# Patient Record
Sex: FEMALE | URBAN - METROPOLITAN AREA
[De-identification: names, ages, dates, MRNs, and addresses within clinical notes are randomized per-mention and may not be internally consistent; named-entity substitution may affect disease eponyms.]

---

## 2024-01-22 ENCOUNTER — OFFICE VISIT (OUTPATIENT)
Dept: OBSTETRICS AND GYNECOLOGY | Facility: CLINIC | Age: 69
End: 2024-01-22
Payer: MEDICARE

## 2024-01-22 VITALS
WEIGHT: 97.8 LBS | SYSTOLIC BLOOD PRESSURE: 134 MMHG | DIASTOLIC BLOOD PRESSURE: 82 MMHG | BODY MASS INDEX: 18.46 KG/M2 | HEIGHT: 61 IN

## 2024-01-22 DIAGNOSIS — Z01.419 WELL WOMAN EXAM: Primary | ICD-10-CM

## 2024-01-22 PROCEDURE — 1126F AMNT PAIN NOTED NONE PRSNT: CPT | Performed by: OBSTETRICS & GYNECOLOGY

## 2024-01-22 PROCEDURE — 1036F TOBACCO NON-USER: CPT | Performed by: OBSTETRICS & GYNECOLOGY

## 2024-01-22 PROCEDURE — G0101 CA SCREEN;PELVIC/BREAST EXAM: HCPCS | Performed by: OBSTETRICS & GYNECOLOGY

## 2024-01-22 PROCEDURE — 1159F MED LIST DOCD IN RCRD: CPT | Performed by: OBSTETRICS & GYNECOLOGY

## 2024-01-22 RX ORDER — OLMESARTAN MEDOXOMIL 5 MG/1
1 TABLET ORAL DAILY
COMMUNITY
Start: 2022-08-18

## 2024-01-22 ASSESSMENT — ENCOUNTER SYMPTOMS
DEPRESSION: 0
DIARRHEA: 0
BLOOD IN STOOL: 0
HEMATURIA: 0
SHORTNESS OF BREATH: 0
ABDOMINAL PAIN: 0
UNEXPECTED WEIGHT CHANGE: 0
CHILLS: 0
FREQUENCY: 0
VOMITING: 0
FEVER: 0
MUSCULOSKELETAL NEGATIVE: 0
OCCASIONAL FEELINGS OF UNSTEADINESS: 0
PSYCHIATRIC NEGATIVE: 0
BACK PAIN: 0
RESPIRATORY NEGATIVE: 0
GASTROINTESTINAL NEGATIVE: 0
NEUROLOGICAL NEGATIVE: 0
CONSTITUTIONAL NEGATIVE: 0
NAUSEA: 0
FATIGUE: 0
ABDOMINAL DISTENTION: 0
CONSTIPATION: 0
SLEEP DISTURBANCE: 0
APPETITE CHANGE: 0
DYSURIA: 0
ENDOCRINE NEGATIVE: 0
FLANK PAIN: 0
COLOR CHANGE: 0
CARDIOVASCULAR NEGATIVE: 0
LOSS OF SENSATION IN FEET: 0
ALLERGIC/IMMUNOLOGIC NEGATIVE: 0
EYES NEGATIVE: 0
HEMATOLOGIC/LYMPHATIC NEGATIVE: 0

## 2024-01-22 ASSESSMENT — PAIN SCALES - GENERAL: PAINLEVEL: 0-NO PAIN

## 2024-01-22 NOTE — PROGRESS NOTES
"History Of Present Illness  Routine Gyn Exam  Rebeca Young here for routine WWE.    Pt is postmenopausal.  Denies spotting or bleeding.   Uterine didelphys.  Pt has h/o breast cancer 20+ years ago.     Concerns: none.     New health issues or surgeries since last visit: denies ; stopped fosamax d/t stomach issues, waiting to start reclast - managed by PCP And Rheum  Exercise: regular exercise .  2 children, 4 granddaughters      Gynecologic History  Postmenopausal.  Sexually active: .  Last Pap:  wnl .   Last mammogram: .   Last Colonoscopy:  up to date .   Last DEXA: .     Obstetric History  OB History   No obstetric history on file.        Review of Systems   Constitutional:  Negative for appetite change, chills, fatigue, fever and unexpected weight change.   Respiratory:  Negative for shortness of breath.    Cardiovascular:  Negative for chest pain.   Gastrointestinal:  Negative for abdominal distention, abdominal pain, blood in stool, constipation, diarrhea, nausea and vomiting.   Endocrine: Negative for cold intolerance and heat intolerance.   Genitourinary:  Negative for dyspareunia, dysuria, flank pain, frequency, genital sores, hematuria, menstrual problem, pelvic pain, urgency, vaginal bleeding, vaginal discharge and vaginal pain.   Musculoskeletal:  Negative for back pain.   Skin:  Negative for color change.   Psychiatric/Behavioral:  Negative for sleep disturbance.        /82   Ht 1.549 m (5' 1\")   Wt (!) 44.4 kg (97 lb 12.8 oz)   BMI 18.48 kg/m²      Physical Exam  Constitutional:       Appearance: Normal appearance.   HENT:      Head: Normocephalic and atraumatic.   Chest:   Breasts:     Right: Normal.      Comments: Left breast s/p mastectomy and reconstruction   Abdominal:      General: Abdomen is flat.      Palpations: Abdomen is soft.      Tenderness: There is no abdominal tenderness.   Genitourinary:     General: Normal vulva.      Vagina: Normal.      " Cervix: Normal.      Uterus: Normal.       Adnexa: Right adnexa normal and left adnexa normal.      Comments: Left cervix more prominent, right cervix flush with vaginal wall  Skin:     General: Skin is warm and dry.   Neurological:      Mental Status: She is alert and oriented to person, place, and time.   Psychiatric:         Mood and Affect: Mood normal.              Assessment/Plan         Routine Well Woman Exam Today  Discussed diet and exercise.   Reviewed routine health screenings.   Pap: 2021 wnl   Recommend annual mammograms.  Has followed with Dr. Gillespie in the breast center however he will be retiring and she will cont with routine annual mammograms.   Currently up to date on colon cancer screening.   Osteoporosis managed by PCP and now Rheum for Reclast infusion               Valerie Sagastume MD

## 2024-02-26 DIAGNOSIS — Z80.3 FAMILY HISTORY OF BREAST CANCER: Primary | ICD-10-CM

## 2024-05-29 ENCOUNTER — TELEPHONE (OUTPATIENT)
Dept: GENETICS | Facility: CLINIC | Age: 69
End: 2024-05-29
Payer: MEDICARE

## 2024-05-29 NOTE — TELEPHONE ENCOUNTER
I left a message for the patient to request further information for their genetics visit. The patients' sister recently had negative genetic testing; their sisters' report would be helpful for the patients' evaluation. The patient also mentioned in a previous message participating in a study with Dr. Ramos. I requested the patient return my call to further discuss their personal and family history. [facility] [place] - [phone]

## 2024-05-29 NOTE — TELEPHONE ENCOUNTER
I spoke with the patient today to obtain further information for their upcoming genetics visit. The patient prefers to bring their sister's report to the visit to protect their sister's privacy. The patient shared they previous participated in a study with Dr. Ramos  and Dr. Kramer in  gyn-onc. The patient shared they expected to receive their genetic testing results 5 years after the study conclusion but, has not received them. She believes she was tested for the BRCA genes as part of the study.

## 2024-06-10 NOTE — PROGRESS NOTES
HISTORY OF PRESENT ILLNESS:  Rebeca is a 68 y.o. female with a personal history of breast cancer (DCIS dx at 47). She also has a family history of breast, kidney, lung and other cancer types.  She was referred to the Cancer Genetics Clinic at [facility] by her medical provider, Dr. Sagastume.  She is interested in genetic testing to clarify her personal risks for cancer, as well as the risks to her family members.   Rebeca was seen in-person today.    PERSONAL HISTORY OF CANCER?  Yes  DCIS diagnosed at 47  Status post a left mastectomy and TRAM flap reconstruction  Tamoxifen x 5 years.  Had genetic counseling at the time, but no genetic testing  Was involved in a study where they did vaginal ultrasounds and blood work-- was involved in this study for 5 years-- she think they did a BRCA test, but she never received any results (I was unable to find this in her chart)    PREVIOUS GENETIC TESTING?  No -- maybe through a study (see above)?    CANCER SCREENING HISTORY:  Mammograms?   Yes   Breast biopsies?  Yes   Vaginal ultrasound to screen for ovarian cancer?   Yes, describe: Yes through study    CA-125?   Yes-- through study  PAP smear?   Yes, describe: history of abnormal PAP smear around 25 years ago    Colonoscopy?   Yes   1st colonoscopy had polyp  2017-- no polyps  5/26/22-- no polyps  Endoscopy?  No   Hysterectomy?  No   Oophorectomy?  No   Dermatology?   Yes, describe: hx of  basal cell carcinoma of forehead-- had a couple of things removed      OTHER MEDICAL CONCERNS:  HTN.    REPRODUCTIVE HISTORY:  # Children:   2  # Pregnancies:   3   Age first birth:   27  Breast feeding?:   Yes-- around 20 months.  Menarche (age):   13  Menopause (age):   52  OCP:    Yes, describe: less than 1 month  HRT:   No      FAMILY HISTORY:  A 4-generation pedigree was obtained. The family history was significant for the following:  Mother was diagnosed with lung cancer at 88 (she was a smoker)-- she passed away at  88.  Sister, 57 years old, was recently diagnosed with breast cancer. She was reported to have had negative genetic cancer -- 48-gene Integrated BRCAAnalysis MyRisk through [facility] 1/2024 (Rebeca showed me a screenshot of her sister's report).  Brother, 63 years old, had a stroke at 49.  Maternal aunt was diagnosed with breast cancer in her 80's-- she passed away in her 80's.  Maternal aunt was diagnosed with liver cancer in her 80's-- she passed away in her 80's.   Maternal aunt passed away from Alzheimer's disease.  Maternal aunt was diagnosed with breast cancer at 40-- she passed away at 50.  Maternal uncle passed away at 92 from kidney failure.  Maternal uncle passed away from Alzheimer's disease.  Maternal uncle was diagnosed with leukemia in his 60's-70's-- he passed away in his 60's-70's.  Maternal uncle passed away from influenza as a young adult.    Father was diagnosed with kidney/bladder (unsure if one metastasized to the other or if both were primary cancers) in his 80's. He was a smoker. He passed away at 97.  Paternal aunt passed away from cancer (type of cancer is unknown/unknown age of diagnosis).  Paternal first cousin, female, possibly had breast cancer (age of diagnosis is unknown).  Paternal grandfather passed away from a stroke.  Paternal first cousin, male, has Parkinson's.    Consanguinity and Ashkenazi Orthodox ancestry were denied. The patient's maternal side is of Polish descent, and the patient's paternal side is of [place] descent. The remainder of the family history was negative for intellectual disability, birth defects, miscarriages/stillbirths, blindness, deafness, kidney disease, heart disease, cancer, muscle disease, and blood disorders.    DISCUSSION:  Rebeac is a 68 y.o. female with a personal history of breast cancer (DCIS dx at 47). She also has a family history of breast, kidney, lung and other cancer types.  Based on her personal and family history of cancer, she meets NCCN  criteria for testing of the BRCA1 and BRCA2 genes.  Rebeca is interested in testing, which is recommended, and was ordered today via the 34-gene [employer] + RNA.    Our discussion is summarized below:    We reviewed genes and chromosomes, inherited forms of breast and ovarian cancer, and the BRCA1 and BRCA2 genes causing HBOC (hereditary breast and ovarian cancer syndrome).   We discussed that most cancers are NOT due to an inherited genetic susceptibility.   However, in about 5-10% of families, there is an inherited genetic mutation that can make a person more susceptible to developing certain forms of cancer.   Within these families, we often see multiple family members with cancer, occurring in multiple generations.   In addition, earlier onset and bilateral cancers are suggestive of an inherited form of cancer.   Finally, there is a clustering of certain types of cancer in these families, such as breast and ovarian cancer.  Within these families, we often see multiple family members with cancer, occurring in multiple generations. In addition, earlier onset and bilateral cancers are suggestive of an inherited form of cancer. There also tends to be a clustering of certain types of cancer in these families, such as breast and ovarian cancer.     We discussed that genetic testing has the potential to identify information that could impact medical management/treatment, and could estimate her risk to develop another primary cancer.     We discussed the BRCA1 and BRCA2 genes, which are two genes that have been linked to early-onset breast and/or ovarian cancer.  Changes in these genes (sometimes referred to as mutations) are inherited in a dominant pattern and confer up to an 87% lifetime risk for breast cancer.   This is elevated compared to the general population risk of 10-12%.   In addition, BRCA1 and BRCA2 mutation carriers have up to a 45% lifetime risk for ovarian cancer, which is elevated over the 1.3%  "general population risk.   Mutation carriers who have already been diagnosed with cancer have an increased risk to develop a second, contralateral breast cancer.   BRCA2 gene mutation carriers have an increased risk for male breast cancer, prostate cancer, melanoma and pancreatic cancer.    Gene mutations in BRCA1 and BRCA2 are inherited in an autosomal dominant fashion.   This means that if an individual has a change in either of these genes, their siblings and children have a 50% chance of also having that gene change and a 50% chance of not having the gene change.     We discussed that there are multiple genes associated with increased breast cancer risk.   Some genes, like the BRCA1 and BRCA2 genes, are considered highly penetrant breast cancer genes, meaning a mutation in the gene confers a high risk of breast cancer.   Other high penetrant breast cancer genes include the following genes: CDH1, PALB2, PTEN, TP53, STK11.  Additionally, there are other intermediate (moderate risk) breast cancer genes.   Other moderate risk breast cancer genes include the following genes: MARY, BARD1, BMPR1A, CHEK2, NF1, RAD51C, RAD51D.  For some of the moderate risk genes, there is often limited information regarding the degree to which a mutation in the gene affects risk of different types of cancers.   Additionally, for some of these moderate risk genes, the appropriate management for individuals who have a mutation in one of these genes is not always clear.   Our knowledge about the cancer risks associated with mutations in these moderate risk genes is always growing, and we will likely be able to provide more comprehensive information in the future.     We then discussed the type of results that can be obtained with this testing.   The first is a positive result.  The next is a negative result.  The last result is a \"maybe\" which we call a variant of unknown significant, meaning that we found a change in your DNA but we are " not sure at this time if it increases the risk for cancer or not.    Rebeca was counseled about hereditary cancer susceptibility including cancer risks, options for increased screening and/or risk reduction, genetic testing, and the implications for other family members.   We discussed performing genetic testing in the context of a multi-gene panel test that looks at the BRCA1 and BRCA2, as well as moderate penetrant genes.   Rebeca is interested in this approach, which is recommended and was ordered today via the 34-gene CancerNext + RNA panel from [employer].    [facility] CancerNext panel, which examines the following 34 genes + RNA:  APC, MARY, AXIN2, BARD1, BMPR1A, BRCA1, BRCA2, BRIP1, CDH1, CDK4, CDKN2A, CHEK2, DICER1, EPCAM, GREM1, HOXB13, MLH1, MSH2, MSH3, MSH6, MUTYH, NF1, NTHL1, PALB2, PMS2, POLD1, POLE, PTEN, RAD51C, RAD51D, SMAD4, SMARCA4, STK11, TP53.    Results are typically available within ~3-4 weeks.  Your test results may be released to you when they are reported.   We have a follow-up scheduled on 7/15/24 at 10 to review these results in detail.   If you choose to view your results in advance of your scheduled visit, your provider may not be available to discuss them at that time.  Most people choose to review results with their provider at their follow-up visit.   If you choose to view your results prior to the appointment and they are negative, we still advise keeping your follow-up appointment as there may be other screening/referrals recommended and discussed at your follow-up appointment.

## 2024-06-17 ENCOUNTER — CLINICAL SUPPORT (OUTPATIENT)
Dept: GENETICS | Facility: HOSPITAL | Age: 69
End: 2024-06-17
Payer: MEDICARE

## 2024-06-17 ENCOUNTER — LAB (OUTPATIENT)
Dept: LAB | Facility: HOSPITAL | Age: 69
End: 2024-06-17
Payer: MEDICARE

## 2024-06-17 VITALS
WEIGHT: 95.9 LBS | DIASTOLIC BLOOD PRESSURE: 79 MMHG | BODY MASS INDEX: 18.11 KG/M2 | SYSTOLIC BLOOD PRESSURE: 145 MMHG | OXYGEN SATURATION: 98 % | HEIGHT: 61 IN | TEMPERATURE: 97.3 F | HEART RATE: 75 BPM

## 2024-06-17 DIAGNOSIS — Z85.3 PERSONAL HISTORY OF BREAST CANCER: ICD-10-CM

## 2024-06-17 DIAGNOSIS — Z80.1 FAMILY HISTORY OF LUNG CANCER: ICD-10-CM

## 2024-06-17 DIAGNOSIS — Z80.8 FAMILY HISTORY OF THYROID CANCER: ICD-10-CM

## 2024-06-17 DIAGNOSIS — Z80.3 FAMILY HISTORY OF BREAST CANCER: ICD-10-CM

## 2024-06-17 LAB
HOLD SPECIMEN: NORMAL
HOLD SPECIMEN: NORMAL

## 2024-06-17 PROCEDURE — GENMD PR GENETICS VISIT (MEDICAID/MEDICARE): Performed by: GENETIC COUNSELOR, MS

## 2024-06-17 PROCEDURE — 36415 COLL VENOUS BLD VENIPUNCTURE: CPT

## 2024-06-17 RX ORDER — OMEPRAZOLE 20 MG/1
20 TABLET, DELAYED RELEASE ORAL
COMMUNITY

## 2024-06-17 ASSESSMENT — PAIN SCALES - GENERAL: PAINLEVEL: 0-NO PAIN

## 2024-06-17 NOTE — PATIENT INSTRUCTIONS
PLAN:  Rebeca elected to undergo genetic testing via [employer]'s 34-gene CancerNext + RNA panel.  Verbal consent for testing was given.   An order was placed for her to have her blood drawn for this testing.  She was given a DNA/RNA [facility] kit and was instructed to bring the test kit to a  outpatient blood draw lab to have her blood drawn for this testing (using the test tubes provided in the kit).   After your blood is drawn, the  lab will send your sample out to [facility] for analysis.  She was scheduled for a virtual follow-up appointment on 7/15/24 at .  We remain available to Rebeca or her family members at [phone] if any questions arise regarding information discussed at today's visit.    Candy Ko, [facility] [place]  Certified Genetic Counselor  [place] [facility]  [phone]    Reviewed by:  Dr. Danielle Quezada  Clinical   [facility]  [phone]

## 2024-06-25 LAB — SCAN RESULT: NORMAL

## 2024-06-26 ENCOUNTER — DOCUMENTATION (OUTPATIENT)
Dept: GENETICS | Facility: CLINIC | Age: 69
End: 2024-06-26
Payer: MEDICARE

## 2024-06-26 NOTE — PROGRESS NOTES
DISCUSSION:  You will receive a copy of your genetic testing results in the mail.   Below is a summary of what we discussed today.    Rebeca Young is a 68 y.o. female with a personal history of breast cancer (DCIS dx at 47). She also has a family history of breast, kidney, lung and other cancer types.   Rebeca was initially seen in-person in the Cancer Genetics Clinic on 6/17/24 for counseling and coordination of testing.    Based on her personal and family history, the 34-gene Cancer-Next + RNA Panel from [facility] was recommended and ordered.  I called her today to review the results of this testing, and our discussion is summarized below.    Rebeca's results were NEGATIVE, meaning that no disease causing mutations were identified.  A genetic cause for her personal and family history of cancer has not been identified.    One reason Rebeca underwent genetic testing was to better understand her risk to develop a second breast cancer to help determine if further breast screening or surgery is indicated.  Because her results were negative, Rebeca does not have an identifiable genetic risk factor that places her at an increased risk to develop a second breast cancer.     While it is reassuring that no mutations were detected in the tested genes Rebeca's female relatives are considered to have an increased risk to develop breast cancer over the general population, based on the family history alone.   They should speak to their healthcare providers about their breast cancer screening protocols, as they may be a candidate for annual breast MRIs, in addition to an annual mammogram and clinic breast exam.   For women with a lifetime risk of breast cancer estimated to be >20%, The National Comprehensive Cancer Network (NCCN) guidelines include:   Breast awareness,   Clinical breast examination performed by a physician every 6-12 months.  Annual mammogram to begin 10 years prior to the youngest breast cancer  diagnosis in the family, but not less than age 30.   The NCCN also suggests screening with breast MRI as an adjunct to mammogram in the high risk setting, with breast MRI screening beginning 10 years prior to the youngest breast cancer diagnosis in the family, but not less than age 25.  Recommendations and options for management should be discussed with managing physicians.     Genetic testing is not recommended for your children at this time unless there is a strong family history of cancer on their father's side.    In addition, it is important for you to follow all other age-related cancer screenings per your primary care providers' recommendations, such as Pap smears, colonoscopies, etc.    PLAN:  I will mail you a copy of your test report.  Our understanding of genetic contribution to cancer diagnoses is always evolving, so there may be additional testing recommended in the future.   She can contact us in 3-5 years to determine if there have been any changes since our discussion today.  Please call me at [phone] with any questions.    Candy Ko [facility]  Certified Genetic Counselor  [facility] [facility]  [phone]    Reviewed by:  Dr. Danielle Quezada  Clinical   [facility]  [phone]

## 2024-07-15 ENCOUNTER — OFFICE VISIT (OUTPATIENT)
Dept: ORTHOPEDIC SURGERY | Facility: HOSPITAL | Age: 69
End: 2024-07-15
Payer: MEDICARE

## 2024-07-15 ENCOUNTER — HOSPITAL ENCOUNTER (OUTPATIENT)
Dept: RADIOLOGY | Facility: HOSPITAL | Age: 69
Discharge: HOME | End: 2024-07-15
Payer: MEDICARE

## 2024-07-15 ENCOUNTER — APPOINTMENT (OUTPATIENT)
Dept: GENETICS | Facility: HOSPITAL | Age: 69
End: 2024-07-15
Payer: MEDICARE

## 2024-07-15 VITALS — HEIGHT: 60 IN | BODY MASS INDEX: 18.46 KG/M2 | WEIGHT: 94 LBS

## 2024-07-15 DIAGNOSIS — M25.562 LEFT KNEE PAIN, UNSPECIFIED CHRONICITY: ICD-10-CM

## 2024-07-15 PROCEDURE — 99203 OFFICE O/P NEW LOW 30 MIN: CPT | Performed by: FAMILY MEDICINE

## 2024-07-15 PROCEDURE — 1036F TOBACCO NON-USER: CPT | Performed by: FAMILY MEDICINE

## 2024-07-15 PROCEDURE — 1159F MED LIST DOCD IN RCRD: CPT | Performed by: FAMILY MEDICINE

## 2024-07-15 PROCEDURE — 73564 X-RAY EXAM KNEE 4 OR MORE: CPT | Mod: LT

## 2024-07-15 PROCEDURE — 99213 OFFICE O/P EST LOW 20 MIN: CPT | Performed by: FAMILY MEDICINE

## 2024-07-15 PROCEDURE — 73564 X-RAY EXAM KNEE 4 OR MORE: CPT | Mod: LEFT SIDE | Performed by: RADIOLOGY

## 2024-07-15 PROCEDURE — 1125F AMNT PAIN NOTED PAIN PRSNT: CPT | Performed by: FAMILY MEDICINE

## 2024-07-15 ASSESSMENT — PAIN - FUNCTIONAL ASSESSMENT: PAIN_FUNCTIONAL_ASSESSMENT: 0-10

## 2024-07-15 ASSESSMENT — PAIN SCALES - GENERAL: PAINLEVEL_OUTOF10: 4

## 2024-07-15 NOTE — PROGRESS NOTES
** Please excuse any errors in grammar or translation related to this dictation. Voice recognition software was utilized to prepare this document. **    Assessment & Plan:  Patient has acute left knee pain following low impact trauma.  No ligamentous laxity on exam.  Intact extensor mechanism.  X-rays negative.  Suspect bone contusion as cause of her weightbearing pain.  Discussed with patient that this injury is self-limited and should improve over the next 2 to 4 weeks.  During that time can continue using the walker she has at home for assistance.  As symptoms improve can discontinue the walker.  OTC analgesics as needed for pain control.  Patient will schedule follow-up for 3 weeks for reassessment.  Patient agrees this plan of care.    Chief complaint:  Left knee pain    HPI:  68-year-old female presents to the Ortho injury clinic with left knee pain x 2 days.  States she was walking downstairs into her pool when she missed stepped on the bottom step and landed on the ground with knee extended.  Had difficulty with ambulating yesterday and was using walker for support.  Pain has improved from yesterday.  Pain only bothers her when she is weightbearing.  When seated she is pain-free.    Patient Active Problem List   Diagnosis    Family history of breast cancer    Personal history of breast cancer    Family history of thyroid cancer    Family history of lung cancer     Past Surgical History:   Procedure Laterality Date    CERVICAL BIOPSY  W/ LOOP ELECTRODE EXCISION  01/30/2015    Cervical Loop Electrosurgical Excision (LEEP)    COLONOSCOPY  03/05/2014    Complete Colonoscopy    DILATION AND CURETTAGE OF UTERUS  01/30/2015    Dilation And Curettage    MASTECTOMY  01/30/2015    Breast Surgery Mastectomy    OTHER SURGICAL HISTORY  01/30/2015    Breast Surgery Reconstruction With TRAM    OTHER SURGICAL HISTORY  01/30/2015    Hysteroscopy     Current Outpatient Medications on File Prior to Visit   Medication Sig  Dispense Refill    olmesartan (Benicar) 5 mg tablet Take 1 tablet (5 mg) by mouth once daily.      omeprazole OTC (PriLOSEC OTC) 20 mg EC tablet Take 1 tablet (20 mg) by mouth once daily in the morning. Take before meals. Do not crush, chew, or split.       No current facility-administered medications on file prior to visit.         Exam:  LEFT Knee Exam:  Antalgic gait  No effusion, ecchymosis, erythema, warmth  Non-TTP over medial joint line, lateral joint line, patella, quad tendon, patellar tendon, MCL, LCL, popliteal fossa  AROM from 0 to 120 deg with 5/5 strength  ACL: [-]Lachman, [-]Anterior drawer  PCL:  [-]Posterior drawer  MCL: No laxity or pain with valgus stress at 0 or 30 deg  LCL: No laxity or pain with varus stress at 0 or 30 deg  MENISCAL SIGNS: [-]Shlomo´s pain or clicking, [-] Apley compression  PATELLA: [-]Grind, [-]Compression,  FAT PAD:  [-]Hoffa´s   PLC:  [-]Dial at 30deg, [-]Dial at 90deg      Results:  X-ray left knee obtained 7/15/2024 personally interpreted as no acute fracture.  Normal alignment.  No suprapatellar effusion.  Lab Results   Component Value Date    HGBA1C 5.3 11/21/2023

## 2024-07-29 DIAGNOSIS — Z12.31 BREAST CANCER SCREENING BY MAMMOGRAM: Primary | ICD-10-CM

## 2024-08-14 ENCOUNTER — APPOINTMENT (OUTPATIENT)
Dept: ORTHOPEDIC SURGERY | Facility: CLINIC | Age: 69
End: 2024-08-14
Payer: MEDICARE

## 2024-08-26 ENCOUNTER — HOSPITAL ENCOUNTER (OUTPATIENT)
Dept: RADIOLOGY | Facility: HOSPITAL | Age: 69
Discharge: HOME | End: 2024-08-26
Payer: MEDICARE

## 2024-08-26 VITALS — BODY MASS INDEX: 17.75 KG/M2 | HEIGHT: 61 IN | WEIGHT: 94 LBS

## 2024-08-26 DIAGNOSIS — Z12.31 ENCOUNTER FOR SCREENING MAMMOGRAM FOR MALIGNANT NEOPLASM OF BREAST: ICD-10-CM

## 2024-08-26 PROCEDURE — 77063 BREAST TOMOSYNTHESIS BI: CPT | Mod: RIGHT SIDE | Performed by: STUDENT IN AN ORGANIZED HEALTH CARE EDUCATION/TRAINING PROGRAM

## 2024-08-26 PROCEDURE — 77067 SCR MAMMO BI INCL CAD: CPT | Mod: 52,RT

## 2024-08-26 PROCEDURE — 77067 SCR MAMMO BI INCL CAD: CPT | Mod: RIGHT SIDE | Performed by: STUDENT IN AN ORGANIZED HEALTH CARE EDUCATION/TRAINING PROGRAM

## 2024-11-25 ENCOUNTER — APPOINTMENT (OUTPATIENT)
Dept: GASTROENTEROLOGY | Facility: CLINIC | Age: 69
End: 2024-11-25
Payer: MEDICARE

## 2024-11-27 ENCOUNTER — APPOINTMENT (OUTPATIENT)
Dept: GASTROENTEROLOGY | Facility: CLINIC | Age: 69
End: 2024-11-27
Payer: MEDICARE

## 2025-05-21 ENCOUNTER — APPOINTMENT (OUTPATIENT)
Dept: ORTHOPEDIC SURGERY | Facility: HOSPITAL | Age: 70
End: 2025-05-21
Payer: MEDICARE

## 2025-07-01 NOTE — PROGRESS NOTES
Subjective :  Patient ID: Rebeca Young is a 69 y.o. female presenting as a new patient     History of Present Illness: Norma is a 69-year-old female with a history of HTN, hyperlipidemia, MVP, GERD, DCIS breast cancer and hypothyroidism.    She had TRAM flap breast surgery 2002 to her left breast at  with Dr. Cheng Roman, she is happy with her reconstruction result. She has complaints of a bulging abdomen and would like to know her surgical options. Patient denies any pain or compression on her bladder. She stated her main concern is to make sure everything is okay.    Objective :  Physical Exam  Vitals and nursing note reviewed. Exam conducted with a chaperone present.   Constitutional:       General: not in acute distress.     Appearance: not ill-appearing.   Eyes:      Extraocular Movements: Extraocular movements intact.      Conjunctiva/sclera: Conjunctivae normal.      Pupils: Pupils are equal, round, and reactive to light.   Cardiovascular:      Rate and Rhythm: Normal rate and regular rhythm.      Pulses: Normal pulses.   Pulmonary:      Effort: Pulmonary effort is normal.      Breath sounds: Normal breath sounds.   Abdominal:      Palpations: Abdomen is soft. There is no mass.      Tenderness: There is no abdominal tenderness.      Hernia: No hernia is present.      A reducible abdominal bulge was felt.  Musculoskeletal:         General: No swelling or tenderness.      Cervical back: Normal range of motion and neck supple.   Skin:     Capillary Refill: Capillary refill takes less than 2 seconds.      Coloration: Skin is not jaundiced.      Findings: No bruising or rash.   Neurological:      General: No focal deficit present.      Mental Status: oriented to person, place, and time.   Psychiatric:         Mood and Affect: Mood normal.         Behavior: Behavior normal.         Thought Content: Thought content normal.         Judgment: Judgment normal.     Large, reducible abdominal bulge at the lower  abdomen without pain.     BI mammo right screening 8/26/24   IMPRESSION:No mammographic evidence of malignancy    Assessment/Plan :  A 69 y.o. female, Rebeca Zacarias, presented today for concern regarding an abdominal bulge. She had TRAM flap breast surgery 2002 to her left breast at  with Dr. Cheng Roman, and she is happy with her reconstruction result. She has concerns that this could be related to the mesh that was placed during her surgery and wants confirmation that nothing major is going on. On PE a reducible abdominal bulge was felt across the lower abdomen. No other abnormal findings were seen.    Plan:  -CT abdomen and pelvis  - Will see the patient after the CT.

## 2025-07-07 ENCOUNTER — APPOINTMENT (OUTPATIENT)
Dept: PLASTIC SURGERY | Facility: CLINIC | Age: 70
End: 2025-07-07
Payer: MEDICARE

## 2025-07-07 VITALS
BODY MASS INDEX: 18.12 KG/M2 | DIASTOLIC BLOOD PRESSURE: 84 MMHG | SYSTOLIC BLOOD PRESSURE: 133 MMHG | HEIGHT: 61 IN | WEIGHT: 96 LBS | RESPIRATION RATE: 16 BRPM | HEART RATE: 89 BPM

## 2025-07-07 DIAGNOSIS — R19.00 ABDOMINAL WALL BULGE: ICD-10-CM

## 2025-07-07 DIAGNOSIS — Z98.890 HISTORY OF BREAST RECONSTRUCTION: ICD-10-CM

## 2025-07-07 PROCEDURE — 99202 OFFICE O/P NEW SF 15 MIN: CPT

## 2025-07-07 PROCEDURE — 3008F BODY MASS INDEX DOCD: CPT

## 2025-07-07 PROCEDURE — 1036F TOBACCO NON-USER: CPT

## 2025-07-07 PROCEDURE — 1159F MED LIST DOCD IN RCRD: CPT

## 2025-07-07 PROCEDURE — 1126F AMNT PAIN NOTED NONE PRSNT: CPT

## 2025-07-07 RX ORDER — TITANIUM DIOXIDE, OCTINOXATE, ZINC OXIDE 4.61; 1.6; .78 G/40ML; G/40ML; G/40ML
CREAM TOPICAL
COMMUNITY

## 2025-07-07 RX ORDER — IBANDRONATE SODIUM 150 MG/1
150 TABLET, FILM COATED ORAL
COMMUNITY
Start: 2025-05-05 | End: 2025-12-14

## 2025-07-07 RX ORDER — HYDROGEN PEROXIDE 3 %
20 SOLUTION, NON-ORAL MISCELLANEOUS
COMMUNITY
Start: 2024-12-10

## 2025-07-07 ASSESSMENT — PAIN SCALES - GENERAL: PAINLEVEL_OUTOF10: 0-NO PAIN

## 2025-07-08 ENCOUNTER — LAB (OUTPATIENT)
Dept: LAB | Facility: CLINIC | Age: 70
End: 2025-07-08
Payer: MEDICARE

## 2025-07-08 LAB
CREAT SERPL-MCNC: 0.7 MG/DL (ref 0.5–1.05)
EGFRCR SERPLBLD CKD-EPI 2021: >90 ML/MIN/1.73M*2

## 2025-07-08 PROCEDURE — 36415 COLL VENOUS BLD VENIPUNCTURE: CPT

## 2025-07-08 PROCEDURE — 82565 ASSAY OF CREATININE: CPT

## 2025-07-21 ENCOUNTER — APPOINTMENT (OUTPATIENT)
Dept: RADIOLOGY | Facility: CLINIC | Age: 70
End: 2025-07-21
Payer: MEDICARE

## 2025-07-21 DIAGNOSIS — R19.00 ABDOMINAL WALL BULGE: ICD-10-CM

## 2025-07-21 DIAGNOSIS — Z98.890 HISTORY OF BREAST RECONSTRUCTION: ICD-10-CM

## 2025-07-21 PROCEDURE — 74174 CTA ABD&PLVS W/CONTRAST: CPT

## 2025-07-21 PROCEDURE — 2550000001 HC RX 255 CONTRASTS

## 2025-07-21 PROCEDURE — 74174 CTA ABD&PLVS W/CONTRAST: CPT | Performed by: RADIOLOGY

## 2025-07-21 RX ADMIN — IOHEXOL 75 ML: 350 INJECTION, SOLUTION INTRAVENOUS at 14:42

## 2025-07-24 ENCOUNTER — DOCUMENTATION (OUTPATIENT)
Dept: PLASTIC SURGERY | Facility: HOSPITAL | Age: 70
End: 2025-07-24
Payer: MEDICARE

## 2025-07-24 NOTE — LETTER
July 24, 2025     No Recipients    Patient: Rebeca Young   YOB: 1955   Date of Visit: 7/24/2025       Dear Dr. Villagomez Recipients:    Thank you for referring Rebeca Young to me for evaluation. Below are my notes for this consultation.  If you have questions, please do not hesitate to call me. I look forward to following your patient along with you.       Sincerely,     Lindsay Webtser PA-C      CC: No Recipients  ______________________________________________________________________________________    Telephone conversation:  Dr. Hinton was alerted by radiology at 6:30 pm this evening about a CT finding our patient had done outpatient on 7/21/25 with the below message    Good evening, Our radiologists placed an Orange alert on the attached patient's CT imaging. Can you confirm you have received/reviewed the report?   IMPRESSION:  1. The right abdominal rectus musculature appears either absent or  markedly attenuated, which may reflect reported surgical changes  status post flap. There was no definite suture material in the region  noted however. No discrete hernia is noted. No focal soft tissue mass.  2. 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  Additional 7 mm inferior segmental aneurysm is noted. Given the  morphology and lack of associated calcifications, these are worrisome  for relatively high risk of rupture. Given solitary kidney, urology  and vascular surgery consult is advised. Vascular medicine consult is  also advised given multifocal visceral aneurysms.    MACRO:  Critical Finding: Aneurysm. Notification was initiated on 7/24/2025  at 6:28 pm by Wale Cook. (**-OCF-**) Instructions: Vascular  surgery. Immediate.    Signed by: Wale Cook 7/24/2025 6:32  PM  Dictation workstation: [id number]     We reached out to Dr. Campos with vascular around 6:40pm and at 9:30pm we were told Dr. Mehta was on call. A page was sent around 10:30pm to Dr. Mehta who promptly responded to the page and I wpoke with him about the patient and he reviewed the CT. He had some additional questions at which point asked to speak directly to Dr. Hinton. Dr. Yoon called Dr. Hinton a few times before getting a hold of him around 10:45pm. I called the patient at home around 10:40pm and alerted her of the findings and that I would call her back as soon as we got recommendations from Dr. Mehta with vascular surgery.

## 2025-07-25 NOTE — PROGRESS NOTES
Telephone conversation:  Dr. Hinton was alerted by radiology at 6:30 pm this evening about a CT finding our patient had done outpatient on 7/21/25 with the below message    Good evening, Our radiologists placed an Orange alert on the attached patient's CT imaging. Can you confirm you have received/reviewed the report?   IMPRESSION:  1. The right abdominal rectus musculature appears either absent or  markedly attenuated, which may reflect reported surgical changes  status post flap. There was no definite suture material in the region  noted however. No discrete hernia is noted. No focal soft tissue mass.  2. 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  Additional 7 mm inferior segmental aneurysm is noted. Given the  morphology and lack of associated calcifications, these are worrisome  for relatively high risk of rupture. Given solitary kidney, urology  and vascular surgery consult is advised. Vascular medicine consult is  also advised given multifocal visceral aneurysms.    MACRO:  Critical Finding: Aneurysm. Notification was initiated on 7/24/2025  at 6:28 pm by Wale Cook. (**-OCF-**) Instructions: Vascular  surgery. Immediate.    Signed by: Wale Cook 7/24/2025 6:32 PM  Dictation workstation: [id number]     We reached out to Dr. Campos with vascular around 6:40pm and at 9:30pm we were told Dr. Mehta was on call. A page was sent around 10:30pm to Dr. Mehta who promptly responded to the page and I wpoke with him about the patient and he reviewed the CT. He had some additional questions at which point asked to speak directly to Dr. Hinton. Dr. Yoon called Dr. Hinton a few times before getting a hold of him around 10:45pm. I called the patient at home around 10:40pm and alerted her of the findings  and that I would call her back as soon as we got recommendations from Dr. Mehta with vascular surgery.

## 2025-08-06 ENCOUNTER — OFFICE VISIT (OUTPATIENT)
Dept: VASCULAR SURGERY | Facility: CLINIC | Age: 70
End: 2025-08-06
Payer: MEDICARE

## 2025-08-06 VITALS
BODY MASS INDEX: 18.31 KG/M2 | HEART RATE: 77 BPM | DIASTOLIC BLOOD PRESSURE: 86 MMHG | WEIGHT: 97 LBS | HEIGHT: 61 IN | SYSTOLIC BLOOD PRESSURE: 151 MMHG

## 2025-08-06 DIAGNOSIS — I72.8 SPLENIC ARTERY ANEURYSM: ICD-10-CM

## 2025-08-06 DIAGNOSIS — I72.2 ANEURYSM OF RIGHT RENAL ARTERY: Primary | ICD-10-CM

## 2025-08-06 PROCEDURE — 99205 OFFICE O/P NEW HI 60 MIN: CPT | Performed by: SURGERY

## 2025-08-06 PROCEDURE — 99215 OFFICE O/P EST HI 40 MIN: CPT | Performed by: SURGERY

## 2025-08-06 PROCEDURE — 3008F BODY MASS INDEX DOCD: CPT | Performed by: SURGERY

## 2025-08-06 PROCEDURE — 1159F MED LIST DOCD IN RCRD: CPT | Performed by: SURGERY

## 2025-08-06 PROCEDURE — 1126F AMNT PAIN NOTED NONE PRSNT: CPT | Performed by: SURGERY

## 2025-08-06 PROCEDURE — 1036F TOBACCO NON-USER: CPT | Performed by: SURGERY

## 2025-08-06 ASSESSMENT — ENCOUNTER SYMPTOMS
BACK PAIN: 0
WOUND: 0
APNEA: 0
ABDOMINAL PAIN: 0
WEAKNESS: 0

## 2025-08-06 ASSESSMENT — PAIN SCALES - GENERAL: PAINLEVEL_OUTOF10: 0-NO PAIN

## 2025-08-06 NOTE — PROGRESS NOTES
Vascular Surgery Consult/Clinic Note    CC: RRA aneurysms    HPI:  Rebeca Young is 69 y.o. female with CT had CTA abd/pelv for plastic preop evaluation and it revealed incidental findings of multiple right renal artery aneurysms.   Pt denies any abdominal, flank or back pain.     Meds:   Medications Ordered Prior to Encounter[1]     Allergies:   RX Allergies[2]    SH:    Social Drivers of Health     Tobacco Use: Low Risk  (7/7/2025)    Patient History     Smoking Tobacco Use: Never     Smokeless Tobacco Use: Never     Passive Exposure: Not on file   Alcohol Use: Not At Risk (12/3/2024)    Received from [facility]    AUDIT-C     Q1: How often do you have a drink containing alcohol?: 2-3 times a week     Q2: How many drinks containing alcohol do you have on a typical day when you are drinking?: 1 or 2     Q3: How often do you have six or more drinks on one occasion?: Never   Financial Resource Strain: Low Risk  (10/17/2024)    Received from [facility]    Overall Financial Resource Strain (CARDIA)     Difficulty of Paying Living Expenses: Not hard at all   Food Insecurity: No Food Insecurity (10/17/2024)    Received from [facility]    Hunger Vital Sign     Within the past 12 months, you worried that your food would run out before you got the money to buy more.: Never true     Within the past 12 months, the food you bought just didn't last and you didn't have money to get more.: Never true   Transportation Needs: No Transportation Needs (10/17/2024)    Received from [facility]    PRAPARE - Transportation     Lack of Transportation (Medical): No     Lack of Transportation (Non-Medical): No   Physical Activity: Sufficiently Active (12/3/2024)    Received from [facility]    Exercise Vital Sign     On average, how many days per week do you engage in moderate to strenuous exercise (like a brisk walk)?: 7 days     On average, how many minutes do you engage in exercise at this level?: 60  min   Stress: No Stress Concern Present (10/17/2024)    Received from [facility]    [employer] - Occupational Stress Questionnaire     Feeling of Stress : Only a little   Social Connections: Unknown (10/17/2024)    Received from [facility]    Social Connection and Isolation Panel     In a typical week, how many times do you talk on the phone with family, friends, or neighbors?: More than three times a week     How often do you get together with friends or relatives?: More than three times a week     How often do you attend Amish or Islam services?: More than 4 times per year     Do you belong to any clubs or organizations such as Amish groups, unions, fraternal or athletic groups, or school groups?: Patient declined     How often do you attend meetings of the clubs or organizations you belong to?: Patient declined     Are you , , , , never , or living with a partner?:    Intimate Partner Violence: Not on file   Depression: Not at risk (12/3/2024)    Received from [facility]    PHQ-2     PHQ-2 score: 0   Housing Stability: Unknown (10/17/2024)    Received from [facility]    Housing Stability Vital Sign     In the last 12 months, was there a time when you were not able to pay the mortgage or rent on time?: No     Number of Times Moved in the Last Year: Not on file     Homeless in the Last Year: Not on file   Utilities: Not At Risk (10/17/2024)    Received from [facility]     Threatened with loss of utilities: No   Digital Equity: Not on file   Health Literacy: Not on file        FH:  Family History[3]     ROS:  Review of Systems   HENT:  Negative for congestion.    Eyes:  Negative for visual disturbance.   Respiratory:  Negative for apnea.    Cardiovascular:  Negative for chest pain.   Gastrointestinal:  Negative for abdominal pain.   Musculoskeletal:  Negative for back pain.   Skin:   Negative for wound.   Neurological:  Negative for weakness.        Objective:  Vitals:  There were no vitals filed for this visit.     Exam:  Gen: in NAD  GI: Soft, ND/NT  Ext:  Abd with right abdominal scar (TRAM flap from the past).   BUE and BLE with 5/5 motor str.     Imaging:  CTA abd/pelv (7/21/2025) independently reviewed.   Right renal artery aneurysms near the hilum.    1.8 and 1.4 cm. One of the two renal artery aneurysm has eccentric appearance. They are positioned at the renal artery branches near the hilum.   Solitary right kidney.     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     Assessment & Plan:  Rebeca Young is 69 y.o. 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    - Pt with right renal artery aneurysms.   We discussed that in a patient who is not planning to get pregnant, we usually recommend repair over 2 cm. However, given that she has a solitary kidney, and right renal artery aneurysms at its branch points with eccentric shape, we discussed that if it were to rupture, it would be difficult to emergently embolize which will likely lead to kidney loss. Thus, we discussed option of open repair. After the discussion, patient was in agreement.   Pt will also see Dr. Wills (transplant surgeon) given that this will likely need autotransplant during the arterial reconstruction.         Akhil Mehta MD, PhD  Clinical   [facility] [employer] [employer]  Co-Director, Aortic Center  [facility] [place]             [1]   Current Outpatient Medications on File Prior to Visit   Medication Sig Dispense Refill    cranberry fruit concentrate 215 mg capsule Take by mouth.      esomeprazole (NexIUM) 20 mg DR capsule Take 1 capsule (20 mg) by mouth once daily in the morning. Take before meals.      ibandronate (Boniva) 150 mg tablet Take 1 tablet (150 mg) by mouth every  30 (thirty) days.      olmesartan (Benicar) 5 mg tablet Take 1 tablet (5 mg) by mouth once daily.      omeprazole OTC (PriLOSEC OTC) 20 mg EC tablet Take 1 tablet (20 mg) by mouth once daily in the morning. Take before meals. Do not crush, chew, or split. (Patient not taking: Reported on 7/7/2025)       No current facility-administered medications on file prior to visit.   [2]   Allergies  Allergen Reactions    Cephalexin Rash     Will clarify at next OV   [3]   Family History  Problem Relation Name Age of Onset    Osteoarthritis Mother  60    Breast cancer Sister  58    Heart failure Father  97    Hypertension Father  50    Hyperlipidemia Father  80 - 99    Stroke Sister amanda 40 - 49

## 2025-08-13 ENCOUNTER — APPOINTMENT (OUTPATIENT)
Dept: SURGERY | Facility: CLINIC | Age: 70
End: 2025-08-13
Payer: MEDICARE

## 2025-08-13 ENCOUNTER — TELEPHONE (OUTPATIENT)
Facility: HOSPITAL | Age: 70
End: 2025-08-13

## 2025-08-13 VITALS
RESPIRATION RATE: 20 BRPM | WEIGHT: 97 LBS | BODY MASS INDEX: 18.33 KG/M2 | HEART RATE: 85 BPM | DIASTOLIC BLOOD PRESSURE: 77 MMHG | SYSTOLIC BLOOD PRESSURE: 168 MMHG

## 2025-08-13 DIAGNOSIS — I72.2 RENAL ARTERY ANEURYSM OF NATIVE KIDNEY: Primary | ICD-10-CM

## 2025-08-13 DIAGNOSIS — N18.6 ESRD (END STAGE RENAL DISEASE) (MULTI): Primary | ICD-10-CM

## 2025-08-13 ASSESSMENT — PAIN SCALES - GENERAL: PAINLEVEL_OUTOF10: 0-NO PAIN

## 2025-08-14 ENCOUNTER — TELEPHONE (OUTPATIENT)
Facility: HOSPITAL | Age: 70
End: 2025-08-14
Payer: MEDICARE

## 2025-08-14 ENCOUNTER — DOCUMENTATION (OUTPATIENT)
Dept: TRANSPLANT | Facility: HOSPITAL | Age: 70
End: 2025-08-14
Payer: MEDICARE

## 2025-08-18 ENCOUNTER — APPOINTMENT (OUTPATIENT)
Dept: VASCULAR SURGERY | Facility: HOSPITAL | Age: 70
End: 2025-08-18
Payer: MEDICARE

## 2025-08-19 ENCOUNTER — TELEPHONE (OUTPATIENT)
Facility: HOSPITAL | Age: 70
End: 2025-08-19
Payer: MEDICARE

## 2025-08-20 ENCOUNTER — APPOINTMENT (OUTPATIENT)
Facility: HOSPITAL | Age: 70
End: 2025-08-20
Payer: MEDICARE

## 2025-08-27 ENCOUNTER — TELEPHONE (OUTPATIENT)
Facility: HOSPITAL | Age: 70
End: 2025-08-27
Payer: MEDICARE

## 2026-01-26 ENCOUNTER — APPOINTMENT (OUTPATIENT)
Facility: CLINIC | Age: 71
End: 2026-01-26
Payer: MEDICARE